# Patient Record
Sex: FEMALE | Race: WHITE | NOT HISPANIC OR LATINO | URBAN - METROPOLITAN AREA
[De-identification: names, ages, dates, MRNs, and addresses within clinical notes are randomized per-mention and may not be internally consistent; named-entity substitution may affect disease eponyms.]

---

## 2017-01-19 ENCOUNTER — INPATIENT HOSPITAL (OUTPATIENT)
Dept: URBAN - METROPOLITAN AREA HOSPITAL 93 | Facility: HOSPITAL | Age: 77
End: 2017-01-19
Payer: MEDICARE

## 2017-01-19 DIAGNOSIS — R06.00 DYSPNEA, UNSPECIFIED: ICD-10-CM

## 2017-01-19 DIAGNOSIS — D50.9 IRON DEFICIENCY ANEMIA, UNSPECIFIED: ICD-10-CM

## 2017-01-19 PROCEDURE — 99221 1ST HOSP IP/OBS SF/LOW 40: CPT | Performed by: INTERNAL MEDICINE

## 2017-01-19 PROCEDURE — 99223 1ST HOSP IP/OBS HIGH 75: CPT | Performed by: INTERNAL MEDICINE

## 2017-03-29 ENCOUNTER — OFFICE (OUTPATIENT)
Dept: URBAN - METROPOLITAN AREA CLINIC 10 | Facility: CLINIC | Age: 77
End: 2017-03-29
Payer: MEDICARE

## 2017-03-29 VITALS
DIASTOLIC BLOOD PRESSURE: 68 MMHG | WEIGHT: 128 LBS | SYSTOLIC BLOOD PRESSURE: 151 MMHG | HEIGHT: 59 IN | HEART RATE: 81 BPM

## 2017-03-29 DIAGNOSIS — D64.9 ANEMIA, UNSPECIFIED: ICD-10-CM

## 2017-03-29 PROCEDURE — 99213 OFFICE O/P EST LOW 20 MIN: CPT

## 2017-03-29 PROCEDURE — G8427 DOCREV CUR MEDS BY ELIG CLIN: HCPCS

## 2017-03-29 NOTE — SERVICENOTES
If indeed she is anemic we'll consider colonoscopy as it has been a number of years she had one however she is reluctant to proceed with it.

## 2017-03-29 NOTE — SERVICEHPINOTES
The patient is a 77-year-old white female who is referred for evaluation of anemia. She states she has had it for at least 3 months. She has been transfused with 1 unit of blood so far. Ferritin was noted to be normal at 57. Apparently an attempt was made to do colonoscopy in the hospital but she was unable to take the GoLYTELY prep. She denies any melena or hematochezia. She is not on iron at this time. Last colonoscopy was years ago and no polyps were seen. She denies any abdominal pain, diarrhea or constipation. Weight and appetite are stable. She has no history of peptic ulcer disease, gallbladder disease, liver disease. She is not on any blood thinners.

## 2017-03-30 LAB
CBC COMPLETE BLOOD COUNT W/O DIFF: HEMATOCRIT: 31.4 % — LOW (ref 36–48)
CBC COMPLETE BLOOD COUNT W/O DIFF: HEMOGLOBIN: 9.8 G/DL — LOW (ref 12–16)
CBC COMPLETE BLOOD COUNT W/O DIFF: MCH: 25.9 PG (ref 25–35)
CBC COMPLETE BLOOD COUNT W/O DIFF: MCHC: 31.2 % (ref 30–38)
CBC COMPLETE BLOOD COUNT W/O DIFF: MCV: 83.1 FL (ref 78–102)
CBC COMPLETE BLOOD COUNT W/O DIFF: PLATELET COUNT: 433 K/UL (ref 150–450)
CBC COMPLETE BLOOD COUNT W/O DIFF: RBC DISTRIBUTION WIDTH: 16.5 % — HIGH (ref 11.5–16)
CBC COMPLETE BLOOD COUNT W/O DIFF: RED BLOOD CELL COUNT: 3.78 M/UL — LOW (ref 4–5.5)
CBC COMPLETE BLOOD COUNT W/O DIFF: WHITE BLOOD CELL COUNT: 10.9 K/UL (ref 4–11)

## 2017-08-23 ENCOUNTER — OFFICE (OUTPATIENT)
Dept: URBAN - METROPOLITAN AREA CLINIC 10 | Facility: CLINIC | Age: 77
End: 2017-08-23
Payer: MEDICARE

## 2017-08-23 VITALS
SYSTOLIC BLOOD PRESSURE: 128 MMHG | DIASTOLIC BLOOD PRESSURE: 68 MMHG | WEIGHT: 127 LBS | HEIGHT: 59 IN | HEART RATE: 89 BPM

## 2017-08-23 DIAGNOSIS — D50.0 IRON DEFICIENCY ANEMIA SECONDARY TO BLOOD LOSS (CHRONIC): ICD-10-CM

## 2017-08-23 PROCEDURE — G8427 DOCREV CUR MEDS BY ELIG CLIN: HCPCS

## 2017-08-23 PROCEDURE — 99214 OFFICE O/P EST MOD 30 MIN: CPT
